# Patient Record
Sex: MALE | URBAN - NONMETROPOLITAN AREA
[De-identification: names, ages, dates, MRNs, and addresses within clinical notes are randomized per-mention and may not be internally consistent; named-entity substitution may affect disease eponyms.]

---

## 2022-11-07 ENCOUNTER — OFFICE VISIT (OUTPATIENT)
Dept: URBAN - NONMETROPOLITAN AREA CLINIC 4 | Facility: CLINIC | Age: 47
End: 2022-11-07
Payer: COMMERCIAL

## 2022-11-07 ENCOUNTER — DASHBOARD ENCOUNTERS (OUTPATIENT)
Age: 47
End: 2022-11-07

## 2022-11-07 ENCOUNTER — LAB OUTSIDE AN ENCOUNTER (OUTPATIENT)
Dept: URBAN - NONMETROPOLITAN AREA CLINIC 4 | Facility: CLINIC | Age: 47
End: 2022-11-07

## 2022-11-07 VITALS
HEIGHT: 71 IN | DIASTOLIC BLOOD PRESSURE: 91 MMHG | WEIGHT: 225.2 LBS | HEART RATE: 111 BPM | SYSTOLIC BLOOD PRESSURE: 122 MMHG | TEMPERATURE: 97.4 F | BODY MASS INDEX: 31.53 KG/M2

## 2022-11-07 DIAGNOSIS — K59.01 SLOW TRANSIT CONSTIPATION: ICD-10-CM

## 2022-11-07 DIAGNOSIS — R14.0 BLOATING: ICD-10-CM

## 2022-11-07 DIAGNOSIS — Z12.11 SCREENING FOR COLON CANCER: ICD-10-CM

## 2022-11-07 PROBLEM — 35298007: Status: ACTIVE | Noted: 2022-11-07

## 2022-11-07 PROCEDURE — G8427 DOCREV CUR MEDS BY ELIG CLIN: HCPCS | Performed by: INTERNAL MEDICINE

## 2022-11-07 PROCEDURE — G8417 CALC BMI ABV UP PARAM F/U: HCPCS | Performed by: INTERNAL MEDICINE

## 2022-11-07 PROCEDURE — 99204 OFFICE O/P NEW MOD 45 MIN: CPT | Performed by: INTERNAL MEDICINE

## 2022-11-07 PROCEDURE — G9903 PT SCRN TBCO ID AS NON USER: HCPCS | Performed by: INTERNAL MEDICINE

## 2022-11-07 RX ORDER — LINACLOTIDE 72 UG/1
1 CAPSULE AT LEAST 30 MINUTES BEFORE THE FIRST MEAL OF THE DAY ON AN EMPTY STOMACH CAPSULE, GELATIN COATED ORAL ONCE A DAY
Qty: 90 | Refills: 0 | OUTPATIENT
Start: 2022-11-07 | End: 2023-02-05

## 2022-11-07 RX ORDER — POLYETHYLENE GLYCOL-3350 AND ELECTROLYTES WITH FLAVOR PACK 240; 5.84; 2.98; 6.72; 22.72 G/278.26G; G/278.26G; G/278.26G; G/278.26G; G/278.26G
AS DIRECTED POWDER, FOR SOLUTION ORAL
Qty: 1 | Refills: 0 | OUTPATIENT
Start: 2022-11-07 | End: 2022-11-08

## 2022-11-07 NOTE — HPI-TODAY'S VISIT:
11/7/2022 Patient is a 47 year old  male who presents for abdominal distension. Patient states that he has been very bloated and that he is constipated. He states that when he bends down he has abdominal pain. Patient states that he has high blood pressure. He states that he uses klonopin for sleep. Patient states that he has tried linzess in the past and that it helps him.

## 2022-11-08 LAB
A/G RATIO: 1.5
ABSOLUTE BASOPHILS: 49
ABSOLUTE EOSINOPHILS: 62
ABSOLUTE LYMPHOCYTES: 1435
ABSOLUTE MONOCYTES: 496
ABSOLUTE NEUTROPHILS: 2058
ALBUMIN: 4.4
ALKALINE PHOSPHATASE: 101
ALT (SGPT): 40
AST (SGOT): 32
BASOPHILS: 1.2
BILIRUBIN, TOTAL: 0.9
BUN/CREATININE RATIO: (no result)
BUN: 18
CALCIUM: 9.3
CARBON DIOXIDE, TOTAL: 23
CHLORIDE: 104
CREATININE: 1.05
EGFR: 87
EOSINOPHILS: 1.5
GLOBULIN, TOTAL: 2.9
GLUCOSE: 100
HEMATOCRIT: 44
HEMOGLOBIN: 15.4
LYMPHOCYTES: 35
MCH: 33.1
MCHC: 35
MCV: 94.6
MONOCYTES: 12.1
MPV: 9.3
NEUTROPHILS: 50.2
PLATELET COUNT: 255
POTASSIUM: 4.1
PROTEIN, TOTAL: 7.3
RDW: 13
RED BLOOD CELL COUNT: 4.65
SODIUM: 138
TSH W/REFLEX TO FT4: 1.43
WHITE BLOOD CELL COUNT: 4.1

## 2022-11-18 ENCOUNTER — TELEPHONE ENCOUNTER (OUTPATIENT)
Dept: URBAN - NONMETROPOLITAN AREA CLINIC 4 | Facility: CLINIC | Age: 47
End: 2022-11-18

## 2022-11-18 RX ORDER — POLYETHYLENE GLYCOL-3350 AND ELECTROLYTES 236; 6.74; 5.86; 2.97; 22.74 G/274.31G; G/274.31G; G/274.31G; G/274.31G; G/274.31G
AS DIRECTED POWDER, FOR SOLUTION ORAL ONCE
Qty: 1 | Refills: 0 | OUTPATIENT
Start: 2022-11-23 | End: 2022-11-24

## 2022-12-14 ENCOUNTER — OFFICE VISIT (OUTPATIENT)
Dept: URBAN - METROPOLITAN AREA SURGERY CENTER 13 | Facility: SURGERY CENTER | Age: 47
End: 2022-12-14
Payer: COMMERCIAL

## 2022-12-14 DIAGNOSIS — Z12.11 COLON CANCER SCREENING: ICD-10-CM

## 2022-12-14 PROCEDURE — G8907 PT DOC NO EVENTS ON DISCHARG: HCPCS | Performed by: INTERNAL MEDICINE

## 2022-12-14 PROCEDURE — 45378 DIAGNOSTIC COLONOSCOPY: CPT | Performed by: INTERNAL MEDICINE

## 2023-01-18 ENCOUNTER — OFFICE VISIT (OUTPATIENT)
Dept: URBAN - NONMETROPOLITAN AREA CLINIC 2 | Facility: CLINIC | Age: 48
End: 2023-01-18